# Patient Record
Sex: MALE | Race: WHITE
[De-identification: names, ages, dates, MRNs, and addresses within clinical notes are randomized per-mention and may not be internally consistent; named-entity substitution may affect disease eponyms.]

---
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
## 2021-10-25 NOTE — CARDIOLOGY STRESS TEST REPORT
Stress Test Report


Date of Procedure/Referring:


Date of Procedure:  Oct 25, 2021


PCP


Maurice Amin DO


Admitting Physician


No,Local Physician





Indications:


CP





Baseline Heart Rate:


83





Baseline Blood Pressure:


Blood Pressure Systolic:  116


Blood Pressure Diastolic:  69





Baseline EKG:


Baseline EKG:  NSR





Summary/Conclusion:


Summary:


In summary, the patient started exercising with a baseline heart rate, blood


pressure and EKG mentioned above





Patient was able to exercise for a total of 13 minutes on Simon protocol,  METs 

13.5


Maximum heart rate 168      


Maximum blood pressure 149/59


   


Stress EKG, Minimal nondiagnostic changes 


Recovery EKG   , Return to baseline 





Conclusion:


1.  Good exercise tolerance for a total of 13 minutes on Simon protocol, 13.5 

METs, achieving 87 percent of maximum expected heart rate


2.  Minimal nondiagnostic EKG changes with exercise returned to baseline during 

recovery


3.  No arrhythmia was noted











FELI CHRISTIANSON MD              Oct 25, 2021 10:58

## 2022-05-24 NOTE — DIAGNOSTIC IMAGING REPORT
INDICATION: Back and lower extremity pain.



FINDINGS:

There are no findings of dislocation or pelvic diastases. There

is no acute fracture. There is some spurring at the acetabulum

bilaterally as well as a contour abnormality of the femoral head

and neck junction that suggests underlying femoral acetabular

impingement.



IMPRESSION: No acute process evident at the pelvis or hips.

Morphology of the femoral head and neck junction suggests

underlying femoral acetabular impingement.



Dictated by: 



  Dictated on workstation # [id number]

## 2022-05-24 NOTE — DIAGNOSTIC IMAGING REPORT
EXAMINATION: Lumbar spine radiograph



EXAM DATE: 5/24/2022 5:26 PM



COMPARISON: None available.



HISTORY: MID TO LOWER BACK P CHRONIC, SCHOLIOSIS W/RADICULAR

SYMPTOMS



TECHNIQUE: 3 views



FINDINGS: 



Vertebral body heights and alignment are normal. Mild

levocurvature of the lumbar spine. Disc heights are normal. No

acute fracture.



IMPRESSION:

1. Minimal degenerative changes of lumbar spine without acute

osseous abnormality.



Dictated by: 



  Dictated on workstation # [id number]

## 2022-05-24 NOTE — DIAGNOSTIC IMAGING REPORT
EXAMINATION: Thoracic spine radiograph



EXAM DATE: 5/24/2022 5:26 PM



COMPARISON: None available.



HISTORY: MID TO LOWER BACK P CHRONIC, SCHOLIOSIS W/RADICULAR

SYMPTOMS



TECHNIQUE: 2 views



FINDINGS: 



There is no acute fracture, dislocation, or destructive osseous

process. Vertebral body heights are preserved. There is

significant dextrocurvature of the thoracic spine which is

centered at T9-T10. The soft tissues are normal. Visualized lungs

are clear.



IMPRESSION:

1. Significant dextrocurvature of the thoracic spine without

acute osseous abnormality.



Dictated by: 



  Dictated on workstation # [id number]

## 2022-08-25 ENCOUNTER — HOSPITAL ENCOUNTER (EMERGENCY)
Dept: HOSPITAL 75 - ER | Age: 27
End: 2022-08-25
Payer: SELF-PAY

## 2022-08-25 VITALS — WEIGHT: 132.28 LBS | BODY MASS INDEX: 18.11 KG/M2 | HEIGHT: 71.65 IN

## 2022-08-25 VITALS — DIASTOLIC BLOOD PRESSURE: 73 MMHG | SYSTOLIC BLOOD PRESSURE: 111 MMHG

## 2022-08-25 DIAGNOSIS — M25.552: ICD-10-CM
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
## 2022-08-25 DIAGNOSIS — Z28.310: ICD-10-CM

## 2022-08-25 DIAGNOSIS — M54.50: Primary | ICD-10-CM

## 2022-08-25 DIAGNOSIS — R06.02: ICD-10-CM

## 2022-08-25 LAB
BASOPHILS # BLD AUTO: 0.1 10^3/UL (ref 0–0.1)
BASOPHILS NFR BLD AUTO: 0 % (ref 0–10)
EOSINOPHIL # BLD AUTO: 0 10^3/UL (ref 0–0.3)
EOSINOPHIL NFR BLD AUTO: 0 % (ref 0–10)
HCT VFR BLD CALC: 46 % (ref 40–54)
HGB BLD-MCNC: 15.2 G/DL (ref 13.3–17.7)
LYMPHOCYTES # BLD AUTO: 1.3 10^3/UL (ref 1–4)
LYMPHOCYTES NFR BLD AUTO: 5 % (ref 12–44)
MANUAL DIFFERENTIAL PERFORMED BLD QL: YES
MCH RBC QN AUTO: 31 PG (ref 25–34)
MCHC RBC AUTO-ENTMCNC: 33 G/DL (ref 32–36)
MCV RBC AUTO: 94 FL (ref 80–99)
MONOCYTES # BLD AUTO: 1.5 10^3/UL (ref 0–1)
MONOCYTES NFR BLD AUTO: 6 % (ref 0–12)
NEUTROPHILS # BLD AUTO: 21.5 10^3/UL (ref 1.8–7.8)
NEUTROPHILS NFR BLD AUTO: 87 % (ref 42–75)
PLATELET # BLD: 308 10^3/UL (ref 130–400)
PMV BLD AUTO: 10.2 FL (ref 9–12.2)
WBC # BLD AUTO: 24.7 10^3/UL (ref 4.3–11)

## 2022-08-25 PROCEDURE — 83874 ASSAY OF MYOGLOBIN: CPT

## 2022-08-25 PROCEDURE — 71045 X-RAY EXAM CHEST 1 VIEW: CPT

## 2022-08-25 PROCEDURE — 74177 CT ABD & PELVIS W/CONTRAST: CPT

## 2022-08-25 PROCEDURE — 93041 RHYTHM ECG TRACING: CPT

## 2022-08-25 PROCEDURE — 85730 THROMBOPLASTIN TIME PARTIAL: CPT

## 2022-08-25 PROCEDURE — 85027 COMPLETE CBC AUTOMATED: CPT

## 2022-08-25 PROCEDURE — 72170 X-RAY EXAM OF PELVIS: CPT

## 2022-08-25 PROCEDURE — 82553 CREATINE MB FRACTION: CPT

## 2022-08-25 PROCEDURE — 99285 EMERGENCY DEPT VISIT HI MDM: CPT

## 2022-08-25 PROCEDURE — 80053 COMPREHEN METABOLIC PANEL: CPT

## 2022-08-25 PROCEDURE — 70450 CT HEAD/BRAIN W/O DYE: CPT

## 2022-08-25 PROCEDURE — 85007 BL SMEAR W/DIFF WBC COUNT: CPT

## 2022-08-25 PROCEDURE — 82550 ASSAY OF CK (CPK): CPT

## 2022-08-25 PROCEDURE — 84484 ASSAY OF TROPONIN QUANT: CPT

## 2022-08-25 PROCEDURE — 82150 ASSAY OF AMYLASE: CPT

## 2022-08-25 PROCEDURE — 72128 CT CHEST SPINE W/O DYE: CPT

## 2022-08-25 PROCEDURE — 80320 DRUG SCREEN QUANTALCOHOLS: CPT

## 2022-08-25 PROCEDURE — 85610 PROTHROMBIN TIME: CPT

## 2022-08-25 PROCEDURE — 73130 X-RAY EXAM OF HAND: CPT

## 2022-08-25 PROCEDURE — 80329 ANALGESICS NON-OPIOID 1 OR 2: CPT

## 2022-08-25 PROCEDURE — 72131 CT LUMBAR SPINE W/O DYE: CPT

## 2022-08-25 PROCEDURE — 71260 CT THORAX DX C+: CPT

## 2022-08-25 PROCEDURE — 83735 ASSAY OF MAGNESIUM: CPT

## 2022-08-25 PROCEDURE — 73552 X-RAY EXAM OF FEMUR 2/>: CPT

## 2022-08-25 PROCEDURE — 72125 CT NECK SPINE W/O DYE: CPT

## 2022-08-25 PROCEDURE — 36415 COLL VENOUS BLD VENIPUNCTURE: CPT

## 2022-08-26 LAB
ALBUMIN SERPL-MCNC: 4.8 GM/DL (ref 3.2–4.5)
ALP SERPL-CCNC: 55 U/L (ref 40–136)
ALT SERPL-CCNC: 15 U/L (ref 0–55)
AMYLASE SERPL-CCNC: 34 U/L (ref 25–125)
APAP SERPL-MCNC: < 10 UG/ML (ref 10–30)
APTT BLD: 31 SEC (ref 24–35)
APTT PPP: YELLOW S
BACTERIA #/AREA URNS HPF: NEGATIVE /HPF
BARBITURATES UR QL: NEGATIVE
BENZODIAZ UR QL SCN: NEGATIVE
BILIRUB SERPL-MCNC: 1.1 MG/DL (ref 0.1–1)
BILIRUB UR QL STRIP: NEGATIVE
BUN/CREAT SERPL: 17
CALCIUM SERPL-MCNC: 9.9 MG/DL (ref 8.5–10.1)
CHLORIDE SERPL-SCNC: 104 MMOL/L (ref 98–107)
CK MB SERPL-MCNC: 2.3 NG/ML (ref ?–6.6)
CK SERPL-CCNC: 315 U/L (ref 30–200)
CO2 SERPL-SCNC: 23 MMOL/L (ref 21–32)
COCAINE UR QL: NEGATIVE
CREAT SERPL-MCNC: 0.95 MG/DL (ref 0.6–1.3)
FIBRINOGEN PPP-MCNC: CLEAR MG/DL
GFR SERPLBLD BASED ON 1.73 SQ M-ARVRAT: 111 ML/MIN
GLUCOSE SERPL-MCNC: 127 MG/DL (ref 70–105)
GLUCOSE UR STRIP-MCNC: NEGATIVE MG/DL
INR PPP: 1.1 (ref 0.8–1.4)
KETONES UR QL STRIP: NEGATIVE
LEUKOCYTE ESTERASE UR QL STRIP: NEGATIVE
MAGNESIUM SERPL-MCNC: 1.9 MG/DL (ref 1.6–2.4)
METHADONE UR QL SCN: NEGATIVE
MONOCYTES NFR BLD: 7 %
NEUTS BAND NFR BLD MANUAL: 82 %
NEUTS BAND NFR BLD: 4 %
NITRITE UR QL STRIP: NEGATIVE
OPIATES UR QL SCN: NEGATIVE
OXYCODONE UR QL: NEGATIVE
PH UR STRIP: 6 [PH] (ref 5–9)
POTASSIUM SERPL-SCNC: 3.9 MMOL/L (ref 3.6–5)
PROPOXYPH UR QL: NEGATIVE
PROT SERPL-MCNC: 8.1 GM/DL (ref 6.4–8.2)
PROT UR QL STRIP: (no result)
PROTHROMBIN TIME: 14.7 SEC (ref 12.2–14.7)
RBC #/AREA URNS HPF: (no result) /HPF
RBC MORPH BLD: NORMAL
SODIUM SERPL-SCNC: 141 MMOL/L (ref 135–145)
SP GR UR STRIP: 1.02 (ref 1.02–1.02)
SQUAMOUS #/AREA URNS HPF: (no result) /HPF
TRICYCLICS UR QL SCN: NEGATIVE
VARIANT LYMPHS NFR BLD MANUAL: 7 %
WBC #/AREA URNS HPF: (no result) /HPF

## 2022-08-26 NOTE — DIAGNOSTIC IMAGING REPORT
Indication: Trauma. Chest pain.



FINDINGS: AP chest. There is rather marked scoliosis to the

right. The lungs are well-aerated and clear. No pneumothorax or

pleural effusion. No rib fractures are seen.



IMPRESSION: No acute abnormalities demonstrated.



Dictated by: 



  Dictated on workstation # [id number]

## 2022-08-26 NOTE — DIAGNOSTIC IMAGING REPORT
INDICATION: Left hand pain. Fall.



FINDINGS: 3 views. No fractures or dislocations. Articulating

surfaces are smooth. Joint spaces are well-maintained.



IMPRESSION: Negative left hand.



Dictated by: 



  Dictated on workstation # [id number]

## 2022-08-26 NOTE — ED FALL/INJURY
General


Chief Complaint:  Back Problems


Stated Complaint:  SOB,BACK & LEFT HIP PX,FALL OFF LADDER


Nursing Triage Note:  


PT REPORTS FALLING 16FT LANDING ON HIS "ASS", IS ABLE TO BEAR WEIGHT BUT WITH 


DIFFICULTY AND PAIN.  PT ARRIVED TO ER AFTER REFUSING EMS, WC IN TRIAGE


Source:  other ([employer])





Allergies and Home Medications


Allergies


Coded Allergies:  


     No Known Drug Allergies (Unverified , 11/18/20)





Past Medical-Social-Family Hx


Patient Social History


Tobacco Use?:  No


Use of E-Cig and/or Vaping dev:  No


Substance use?:  No


Alcohol Use?:  No


Pt feels they are or have been:  No





Seasonal Allergies


Seasonal Allergies:  Yes





Past Medical History


Surgeries:  Yes


Orthopedic


Respiratory:  Yes


Asthma


Cardiac:  No


Neurological:  Yes (X9 YEARS AGO)


Stroke


Genitourinary:  No


Gastrointestinal:  No


Musculoskeletal:  No


Endocrine:  No


HEENT:  No


Cancer:  Yes


Skin


Did You Recieve Any Treatments:  Yes


What Type of Treatment Did You:  Chemotherapy, Surgical Intervention


Psychosocial:  Yes


Bipolar


Integumentary:  No


Blood Disorders:  No





Physical Exam


Vital Signs





Vital Signs - First Documented








 8/25/22





 23:26


 


Temp 37.4


 


Pulse 110


 


Resp 20


 


B/P (MAP) 111/73 (86)


 


Pulse Ox 96


 


O2 Delivery Room Air





Capillary Refill : Less Than 3 Seconds


Height, Weight, BMI


Height: '"


Weight: lbs. oz. kg; 18.00 BMI


Method:





Progress/Results/Core Measures


Results/Orders


Lab Results





Laboratory Tests








Test


 8/25/22


23:50 Range/Units


 


 


White Blood Count


 24.7 H


 4.3-11.0


10^3/uL


 


Red Blood Count


 4.92 


 4.30-5.52


10^6/uL


 


Hemoglobin 15.2  13.3-17.7  g/dL


 


Hematocrit 46  40-54  %


 


Mean Corpuscular Volume 94  80-99  fL


 


Mean Corpuscular Hemoglobin 31  25-34  pg


 


Mean Corpuscular Hemoglobin


Concent 33 


 32-36  g/dL





 


Red Cell Distribution Width 11.7  10.0-14.5  %


 


Platelet Count


 308 


 130-400


10^3/uL


 


Mean Platelet Volume 10.2  9.0-12.2  fL


 


Immature Granulocyte % (Auto) 1   %


 


Neutrophils (%) (Auto) 87 H 42-75  %


 


Lymphocytes (%) (Auto) 5 L 12-44  %


 


Monocytes (%) (Auto) 6  0-12  %


 


Eosinophils (%) (Auto) 0  0-10  %


 


Basophils (%) (Auto) 0  0-10  %


 


Neutrophils # (Auto)


 21.5 H


 1.8-7.8


10^3/uL


 


Lymphocytes # (Auto)


 1.3 


 1.0-4.0


10^3/uL


 


Monocytes # (Auto)


 1.5 H


 0.0-1.0


10^3/uL


 


Eosinophils # (Auto)


 0.0 


 0.0-0.3


10^3/uL


 


Basophils # (Auto)


 0.1 


 0.0-0.1


10^3/uL


 


Immature Granulocyte # (Auto)


 0.3 H


 0.0-0.1


10^3/uL


 


Neutrophils % (Manual) 82   %


 


Lymphocytes % (Manual) 7   %


 


Monocytes % (Manual) 7   %


 


Band Neutrophils 4   %


 


Blood Morphology Comment NORMAL   


 


Prothrombin Time 14.7  12.2-14.7  SEC


 


INR Comment 1.1  0.8-1.4  


 


Activated Partial


Thromboplast Time 31 


 24-35  SEC





 


Sodium Level 141  135-145  MMOL/L


 


Potassium Level 3.9  3.6-5.0  MMOL/L


 


Chloride Level 104    MMOL/L


 


Carbon Dioxide Level 23  21-32  MMOL/L


 


Anion Gap 14  5-14  MMOL/L


 


Blood Urea Nitrogen 16  7-18  MG/DL


 


Creatinine


 0.95 


 0.60-1.30


MG/DL


 


Estimat Glomerular Filtration


Rate 111 


  





 


BUN/Creatinine Ratio 17   


 


Glucose Level 127 H   MG/DL


 


Calcium Level 9.9  8.5-10.1  MG/DL


 


Corrected Calcium   8.5-10.1  MG/DL


 


Magnesium Level 1.9  1.6-2.4  MG/DL


 


Total Bilirubin 1.1 H 0.1-1.0  MG/DL


 


Aspartate Amino Transf


(AST/SGOT) 22 


 5-34  U/L





 


Alanine Aminotransferase


(ALT/SGPT) 15 


 0-55  U/L





 


Alkaline Phosphatase 55    U/L


 


Total Creatine Kinase 315 H   U/L


 


Creatine Kinase MB 2.3  <6.6  NG/ML


 


Myoglobin


 353.3 H


 10.0-92.0


NG/ML


 


Troponin I < 0.028  <0.028  NG/ML


 


Total Protein 8.1  6.4-8.2  GM/DL


 


Albumin 4.8 H 3.2-4.5  GM/DL


 


Amylase Level 34    U/L


 


Acetaminophen Level < 10 L 10-30  UG/ML


 


Serum Alcohol < 10  <10  MG/DL








My Orders





Orders - GONZALEZ GONZALEZ DO


Ed Iv/Invasive Line Start (8/25/22 23:38)


O2 (8/25/22 23:38)


Monitor-Rhythm Ecg Trace Only (8/25/22 23:38)


Acetaminophen (8/25/22 23:38)


Alcohol (8/25/22 23:38)


Amylase (8/25/22 23:38)


Cbc With Automated Diff (8/25/22 23:38)


Comprehensive Metabolic Panel (8/25/22 23:38)


Creatine Kinase (8/25/22 23:38)


Creatine Kinase Mb (8/25/22 23:38)


Drug Screen Stat (Urine) (8/25/22 23:38)


Magnesium (8/25/22 23:38)


Protime With Inr (8/25/22 23:38)


Partial Thromboplastin Time (8/25/22 23:38)


Ua Culture If Indicated (8/25/22 23:38)


Myoglobin Serum (8/25/22 23:38)


Troponin I Schuyler (8/25/22 23:38)


Ed Iv/Invasive Line Start (8/25/22 23:38)


Lactated Ringers (Lr 1000 Ml Iv Solution (8/25/22 23:45)


Cervical Collar (8/25/22 23:47)


Foot, Bilateral, 3 View (8/26/22 00:01)


Manual Differential (8/25/22 23:50)


Ct Head/Cervical Spine Wo (8/26/22 00:01)


Ct Thoracic/Lumbar Spine Wo (8/26/22 00:01)


Chest 1 View, Ap/Pa Only (8/26/22 00:01)


Femur, Left, 2 Views (8/26/22 00:01)


Pelvis 1 To 2 Views (8/26/22 00:01)


Ct Chest/Abdomen/Pelvis W (8/26/22 00:01)


Hand, Left, 3 Views (8/26/22 00:01)


Ed Iv/Invasive Line Start (8/26/22 00:40)


Lactated Ringers (Lr 1000 Ml Iv Solution (8/26/22 00:45)





Medications Given in ED





Current Medications








 Medications  Dose


 Ordered  Sig/Jada


 Route  Start Time


 Stop Time Status Last Admin


Dose Admin


 


 Lactated Ringer's  1,000 ml @ 


 0 mls/hr  Q0M ONCE


 IV  8/25/22 23:45


 8/25/22 23:46 DC 8/26/22 00:10


1,000 MLS/HR








Vital Signs/I&O











 8/25/22





 23:26


 


Temp 37.4


 


Pulse 110


 


Resp 20


 


B/P (MAP) 111/73 (86)


 


Pulse Ox 96


 


O2 Delivery Room Air














Blood Pressure Mean:                    86











Departure


Departure-Patient Inst.


Referrals:  


NO,LOCAL PHYSICIAN (PCP/Family)


Primary Care Physician











GONZALEZ GONZALEZ DO                 Aug 26, 2022 00:47

## 2022-08-26 NOTE — DIAGNOSTIC IMAGING REPORT
PROCEDURE: CT chest, abdomen, and pelvis with contrast.



TECHNIQUE: Multiple contiguous axial images were obtained through

the chest, abdomen, and pelvis after the administration of

intravenous contrast. Auto Exposure Controls were utilized during

the CT exam to meet ALARA standards for radiation dose reduction.





INDICATION:  Fall with back pain.



FINDINGS:



CT CHEST: There is marked scoliosis of thoracic spine to the

right. There is mild superior endplate compression deformity of

T7 and T10. There is no retropulsion. No involvement of the

pedicles or posterior elements. The lungs are well-aerated and

clear. Heart is not enlarged. No pneumothorax or pleural

effusion.



IMPRESSION: Mild compression deformity anteriorly of T7 and T10

which appear to be single column.



CT ABDOMEN AND PELVIS: There is opacification of the aorta and

abdominal vessels. The aorta appears normal. No evidence of

visceral laceration. There are no masses. Bowel gas pattern

appears normal. No free air or free fluid. There is fracture of

the left inferior pubic ramus as well as fracture through the

midportion of the left acetabulum which is nondisplaced. Femoral

heads are in good position.



IMPRESSION: Left acetabular and inferior pubic rami fractures

with no soft tissue abnormalities noted. These findings are in

agreement with the preliminary report.



Dictated by: 



  Dictated on workstation # [id number]

## 2022-08-26 NOTE — DIAGNOSTIC IMAGING REPORT
PROCEDURE: CT thoracic and lumbar spine without contrast.



TECHNIQUE: Multiple contiguous axial images were obtained through

the thoracic and lumbar spine without the use of intravenous

contrast. Sagittal and coronal reformations were then performed.

All CT scans use one or more of the following dose optimizing

techniques: automated exposure control, MA and/or KvP adjustment

based on a patient size and exam type, or iterative

reconstruction. 



INDICATION:  Fall from ladder with back pain.



FINDINGS:



CT thoracic spine: There is a superior endplate compression

fracture of T7 involving the anterior and posterior wall with no

significant displacement. Pedicles are intact. Facets show good

alignment. There is minimal superior endplate compression

fracture of T10 involving the anterior wall.



IMPRESSION:

1. Compression fracture T7 appears to be 2 column involvement

both anterior and posterior wall without significant

displacement.

2. Anterior compression fracture T10 involving the anterior wall.



Dictated by: 



  Dictated on workstation # [id number]

## 2022-08-26 NOTE — DIAGNOSTIC IMAGING REPORT
PROCEDURE: CT head and CT cervical spine without contrast.



TECHNIQUE: Multiple contiguous axial images were obtained through

the brain and cervical spine without the use of intravenous

contrast. Sagittal and coronal reformations through the cervical

spine were then performed. Auto Exposure Controls were utilized

during the CT exam to meet ALARA standards for radiation dose

reduction. 



INDICATION: 27-year-old male fell off a ladder one day prior,

presents with persistent headache and neck pain.



COMPARISONS: None



CT head with contrast:



FINDINGS:



The midline structures are not displaced. Lateral, 3rd, and 4th

ventricles are normal in size, shape and anatomic position. There

is no mass, mass effect, hydrocephalus or hemorrhage. Gray-white

differentiation is normal. There is no sulcal effacement. There

are no abnormal extra-axial fluid collections or hemorrhage.

Basilar cisterns appear normal. There is mild chronic maxillary

sinus disease. Orbits and mastoid air cells are normal. Bone

windows show no calvarial changes.



IMPRESSION:



Unremarkable nonenhanced CT brain. Mild chronic maxillary sinus

disease.







CT cervical spine with reconstructions:



FINDINGS:



Axial images in sagittal and coronal reconstructions of the

cervical spine demonstrate no evidence of new or healing

fractures, bony destruction or remodeling. The cervical vertebral

bodies are well aligned, vertebral body heights appear

well-maintained. Prevertebral soft tissue as well as the

predental space and the relationship of the dens to the lateral

mass of C1 is normal. Lung apices are clear. Superior mediastinum

is unremarkable. Parapharyngeal and paraspinous soft tissues are

also unremarkable.



IMPRESSION:



Unremarkable CT cervical spine.



Agree with [facility] report.



 



Dictated by: 



  Dictated on workstation # [id number]

## 2022-08-26 NOTE — DIAGNOSTIC IMAGING REPORT
Indication: Fall. Left hip pain.



FINDINGS: 4 views. Left femur is intact. No fractures are seen.

Femoral heads in normal articulation with the acetabulum.



IMPRESSION: Negative left femur.



Dictated by: 



  Dictated on workstation # [id number]

## 2022-08-26 NOTE — DIAGNOSTIC IMAGING REPORT
Indication: Fall with left hip pain.



FINDINGS: AP pelvis. SI joints and pubic symphysis are in good

alignment. There is a fracture through the left acetabulum and

inferior pubic ramus. No displacement is seen. Femoral heads are

in normal articulation.



IMPRESSION: Nondisplaced left acetabular and inferior pubic ramus

fracture.



Dictated by: 



  Dictated on workstation # [id number]